# Patient Record
Sex: FEMALE | Race: WHITE | NOT HISPANIC OR LATINO | Employment: FULL TIME | URBAN - METROPOLITAN AREA
[De-identification: names, ages, dates, MRNs, and addresses within clinical notes are randomized per-mention and may not be internally consistent; named-entity substitution may affect disease eponyms.]

---

## 2024-06-10 ENCOUNTER — LAB (OUTPATIENT)
Dept: LAB | Facility: HOSPITAL | Age: 44
End: 2024-06-10
Payer: COMMERCIAL

## 2024-06-10 ENCOUNTER — OFFICE VISIT (OUTPATIENT)
Dept: ENDOCRINOLOGY | Facility: CLINIC | Age: 44
End: 2024-06-10
Payer: COMMERCIAL

## 2024-06-10 VITALS
BODY MASS INDEX: 50.02 KG/M2 | HEIGHT: 64 IN | HEART RATE: 96 BPM | SYSTOLIC BLOOD PRESSURE: 120 MMHG | WEIGHT: 293 LBS | OXYGEN SATURATION: 99 % | DIASTOLIC BLOOD PRESSURE: 88 MMHG

## 2024-06-10 DIAGNOSIS — E66.01 MORBID OBESITY: ICD-10-CM

## 2024-06-10 DIAGNOSIS — Z79.4 TYPE 2 DIABETES MELLITUS WITH HYPERGLYCEMIA, WITH LONG-TERM CURRENT USE OF INSULIN: Primary | ICD-10-CM

## 2024-06-10 DIAGNOSIS — E03.9 HYPOTHYROIDISM, UNSPECIFIED TYPE: ICD-10-CM

## 2024-06-10 DIAGNOSIS — E11.65 TYPE 2 DIABETES MELLITUS WITH HYPERGLYCEMIA, WITH LONG-TERM CURRENT USE OF INSULIN: ICD-10-CM

## 2024-06-10 DIAGNOSIS — Z79.4 TYPE 2 DIABETES MELLITUS WITH HYPERGLYCEMIA, WITH LONG-TERM CURRENT USE OF INSULIN: ICD-10-CM

## 2024-06-10 DIAGNOSIS — E11.65 TYPE 2 DIABETES MELLITUS WITH HYPERGLYCEMIA, WITH LONG-TERM CURRENT USE OF INSULIN: Primary | ICD-10-CM

## 2024-06-10 LAB
ALBUMIN SERPL-MCNC: 4.3 G/DL (ref 3.5–5.2)
ALBUMIN UR-MCNC: <1.2 MG/DL
ALBUMIN/GLOB SERPL: 1.5 G/DL
ALP SERPL-CCNC: 103 U/L (ref 39–117)
ALT SERPL W P-5'-P-CCNC: 11 U/L (ref 1–33)
ANION GAP SERPL CALCULATED.3IONS-SCNC: 10 MMOL/L (ref 5–15)
AST SERPL-CCNC: 7 U/L (ref 1–32)
BILIRUB SERPL-MCNC: 0.4 MG/DL (ref 0–1.2)
BUN SERPL-MCNC: 11 MG/DL (ref 6–20)
BUN/CREAT SERPL: 16.7 (ref 7–25)
CALCIUM SPEC-SCNC: 9.1 MG/DL (ref 8.6–10.5)
CHLORIDE SERPL-SCNC: 100 MMOL/L (ref 98–107)
CO2 SERPL-SCNC: 23 MMOL/L (ref 22–29)
CREAT SERPL-MCNC: 0.66 MG/DL (ref 0.57–1)
CREAT UR-MCNC: 51.7 MG/DL
EGFRCR SERPLBLD CKD-EPI 2021: 111.8 ML/MIN/1.73
GLOBULIN UR ELPH-MCNC: 2.9 GM/DL
GLUCOSE BLDC GLUCOMTR-MCNC: 448 MG/DL (ref 70–105)
GLUCOSE SERPL-MCNC: 404 MG/DL (ref 65–99)
HBA1C MFR BLD: 15 % (ref 4.8–5.6)
MICROALBUMIN/CREAT UR: NORMAL MG/G{CREAT}
POTASSIUM SERPL-SCNC: 4.5 MMOL/L (ref 3.5–5.2)
PROT SERPL-MCNC: 7.2 G/DL (ref 6–8.5)
SODIUM SERPL-SCNC: 133 MMOL/L (ref 136–145)

## 2024-06-10 PROCEDURE — 1159F MED LIST DOCD IN RCRD: CPT | Performed by: INTERNAL MEDICINE

## 2024-06-10 PROCEDURE — 82043 UR ALBUMIN QUANTITATIVE: CPT

## 2024-06-10 PROCEDURE — 1160F RVW MEDS BY RX/DR IN RCRD: CPT | Performed by: INTERNAL MEDICINE

## 2024-06-10 PROCEDURE — 36415 COLL VENOUS BLD VENIPUNCTURE: CPT

## 2024-06-10 PROCEDURE — 82948 REAGENT STRIP/BLOOD GLUCOSE: CPT | Performed by: INTERNAL MEDICINE

## 2024-06-10 PROCEDURE — 82570 ASSAY OF URINE CREATININE: CPT

## 2024-06-10 PROCEDURE — 83036 HEMOGLOBIN GLYCOSYLATED A1C: CPT

## 2024-06-10 PROCEDURE — 80053 COMPREHEN METABOLIC PANEL: CPT

## 2024-06-10 PROCEDURE — 99204 OFFICE O/P NEW MOD 45 MIN: CPT | Performed by: INTERNAL MEDICINE

## 2024-06-10 RX ORDER — LANCETS 30 GAUGE
1 EACH MISCELLANEOUS
Qty: 200 EACH | Refills: 5 | Status: SHIPPED | OUTPATIENT
Start: 2024-06-10

## 2024-06-10 RX ORDER — BLOOD-GLUCOSE METER
KIT MISCELLANEOUS
COMMUNITY
Start: 2024-05-29

## 2024-06-10 RX ORDER — INSULIN LISPRO 100 [IU]/ML
5 INJECTION, SOLUTION INTRAVENOUS; SUBCUTANEOUS
Qty: 15 ML | Refills: 1 | Status: SHIPPED | OUTPATIENT
Start: 2024-06-10

## 2024-06-10 RX ORDER — OMEPRAZOLE 20 MG/1
20 CAPSULE, DELAYED RELEASE ORAL DAILY
COMMUNITY

## 2024-06-10 RX ORDER — PEN NEEDLE, DIABETIC 32GX 5/32"
1 NEEDLE, DISPOSABLE MISCELLANEOUS
Qty: 400 EACH | Refills: 2 | Status: SHIPPED | OUTPATIENT
Start: 2024-06-10

## 2024-06-10 RX ORDER — LANCETS 28 GAUGE
EACH MISCELLANEOUS
COMMUNITY
Start: 2024-05-28

## 2024-06-10 RX ORDER — BLOOD-GLUCOSE SENSOR
1 EACH MISCELLANEOUS
Qty: 6 EACH | Refills: 3 | Status: SHIPPED | OUTPATIENT
Start: 2024-06-10

## 2024-06-10 RX ORDER — LEVOTHYROXINE SODIUM 0.05 MG/1
1 TABLET ORAL
COMMUNITY
Start: 2024-05-07 | End: 2024-11-03

## 2024-06-10 RX ORDER — MEDROXYPROGESTERONE ACETATE 150 MG/ML
150 INJECTION, SUSPENSION INTRAMUSCULAR
COMMUNITY

## 2024-06-10 RX ORDER — KETOROLAC TROMETHAMINE 30 MG/ML
1 INJECTION, SOLUTION INTRAMUSCULAR; INTRAVENOUS
Qty: 1 EACH | Refills: 0 | Status: SHIPPED | OUTPATIENT
Start: 2024-06-10

## 2024-06-10 RX ORDER — INSULIN DEGLUDEC 100 U/ML
15 INJECTION, SOLUTION SUBCUTANEOUS
Qty: 15 ML | Refills: 1 | Status: SHIPPED | OUTPATIENT
Start: 2024-06-10

## 2024-06-10 RX ORDER — BLOOD-GLUCOSE METER
KIT MISCELLANEOUS
COMMUNITY

## 2024-06-10 RX ORDER — MELOXICAM 15 MG/1
15 TABLET ORAL DAILY
COMMUNITY

## 2024-06-10 NOTE — PATIENT INSTRUCTIONS
Please,    # Diabetes Management:    Please start insulin therapy as:    - Insulin Glargine (Slow acting insulin) 15 Units in the evening.  - Insulin lispro / aspart (Fast acting / meal time insulin): 5 Units with each meal.    Meal time insulin need to be taken 15 mins before meal. You can bring your insulin with you if you are planning to eat out.    If you plan to miss the meal please miss the meal time insulin as well.    Check your blood glucose four times a day: before breakfast, before lunch, before dinner and before bedtime. Maintain blood glucose logs. OR with continuous glucose monitoring system.      Low Blood Glucose:    - If you feel sweaty, anxious, shakiness, feel weak, trouble walking, feels like passing out or trouble seeing thing, please check your blood glucose and if its less than 70 or if your feel symptoms of low blood glucose then take one of the following:    *3 or 4 glucose tablets  *½ cup of juice or regular soda (not sugar-free)  *2 tablespoons of raisins  *4 or 5 saltine crackers  *1 tablespoon of sugar  *1 tablespoon of honey or corn syrup  *6 to 8 hard candies     Get blood work done today.  Follow-up in clinic back again in 8 weeks time.    Thank you for your visit today.    If you have any questions or concerns please feel free to reach out of the office.

## 2024-06-10 NOTE — PROGRESS NOTES
-----------------------------------------------------------------  ENDOCRINE CLINIC NOTE  -----------------------------------------------------------------        PATIENT NAME: Vivienne Cotto  PATIENT : 1980 AGE: 43 y.o.  MRN NUMBER: [id number]  PRIMARY CARE: [facility]    ==========================================================================    CHIEF COMPLAINT: Diabetes Management  DATE OF SERVICE: 06/10/24    ==========================================================================    HPI / SUBJECTIVE    43 y.o. female is seen in the clinic today for diabetes management.  Also reported to have a history of hypothyroidism which she wants her primary care to manage this stable even though the referral was for hypothyroidism rather than type 2 diabetes.    Diabetes Mellitus Hx:    -Diagnosis Date: Oct 2023  -Last known A1c: 12.8 in Dec 2023    -Current Therapy / Medications:  Nothing    -Past tried medications: (Not currently using)  Metformin - stomach issues    -Home BG logs / monitoring: Not checking    -Meals / Dietary Habits:  Three meals a day    -Last eye exam: Never had diabetes eye exam  -Neuropathy: Reports to have finger tips tingling   -Nephropathy: No CKD reported  -Last lipid panel: Not on statin therapy  -No CAD or CVA  -No personal or family history of pancreatitis or thyroid cancer  -Denied frequent UTI    ==========================================================================                                                PAST MEDICAL HISTORY    Past Medical History:   Diagnosis Date    Thyroid disease        ==========================================================================    PAST SURGICAL HISTORY    Past Surgical History:   Procedure Laterality Date     SECTION      X3       ==========================================================================    FAMILY HISTORY    Family History   Problem Relation Age of Onset    Cancer Mother     Thyroid disease Mother      Hypertension Mother     Diabetes Mother     Hyperlipidemia Mother     Hyperlipidemia Father     Hypertension Father     Heart disease Father     Diabetes Maternal Aunt     Diabetes Maternal Uncle     Diabetes Maternal Grandmother     Diabetes Maternal Grandfather        ==========================================================================    SOCIAL HISTORY    Social History     Socioeconomic History    Marital status:    Tobacco Use    Smoking status: Former     Types: Cigarettes    Smokeless tobacco: Former   Vaping Use    Vaping status: Never Used   Substance and Sexual Activity    Alcohol use: Yes    Drug use: Never    Sexual activity: Defer       ==========================================================================    MEDICATIONS      Current Outpatient Medications:     Blood Glucose Monitoring Suppl (FreeStyle Lite) w/Device kit, USE TO CHECK SUGAR DAILY AND AS NEEDED, Disp: , Rfl:     FREESTYLE LITE test strip, USE TO CHECK SUGAR DAILY AND AS NEEDED DX:E11.9, Disp: , Rfl:     Lancets (freestyle) lancets, USE TO CHECK SUGAR DAILY AND AS NEEDED, Disp: , Rfl:     levothyroxine (SYNTHROID, LEVOTHROID) 50 MCG tablet, Take 1 tablet by mouth Every Morning Before Breakfast., Disp: , Rfl:     medroxyPROGESTERone (DEPO-PROVERA) 150 MG/ML injection, Inject 1 mL into the appropriate muscle as directed by prescriber., Disp: , Rfl:     meloxicam (MOBIC) 15 MG tablet, Take 1 tablet by mouth Daily. with food, Disp: , Rfl:     omeprazole (priLOSEC) 20 MG capsule, Take 1 capsule by mouth Daily., Disp: , Rfl:     Continuous Glucose  (FreeStyle Devika 3 [place]) device, Use 1 each 4 (Four) Times a Day Before Meals & at Bedtime., Disp: 1 each, Rfl: 0    Continuous Glucose Sensor (FreeStyle Devika 3 Sensor) misc, Use 1 each 4 (Four) Times a Day Before Meals & at Bedtime., Disp: 6 each, Rfl: 3    glucose blood test strip, 1 each by Other route 4 (Four) Times a Day Before Meals & at Bedtime., Disp: 200 each,  Rfl: 5    insulin degludec (Tresiba FlexTouch) 100 UNIT/ML solution pen-injector injection, Inject 15 Units under the skin into the appropriate area as directed every night at bedtime., Disp: 15 mL, Rfl: 1    Insulin Lispro, 1 Unit Dial, (HumaLOG KwikPen) 100 UNIT/ML solution pen-injector, Inject 5 Units under the skin into the appropriate area as directed 3 (Three) Times a Day With Meals., Disp: 15 mL, Rfl: 1    Insulin Pen Needle (BD Pen Needle Marci U/F) 32G X 4 MM misc, Use 1 each 4 (Four) Times a Day Before Meals & at Bedtime., Disp: 400 each, Rfl: 2    Lancets misc, Use 1 each 4 (Four) Times a Day Before Meals & at Bedtime., Disp: 200 each, Rfl: 5    ==========================================================================    ALLERGIES    No Known Allergies    ==========================================================================    OBJECTIVE    Vitals:    06/10/24 0847   BP: 120/88   Pulse: 96   SpO2: 99%     Body mass index is 50.98 kg/m².     General: Alert, cooperative, no acute distress  Thyroid:  no enlargement/tenderness/palpable nodules  Lungs: Clear to auscultation bilaterally, respirations unlabored  Heart: Regular rate and rhythm, S1 and S2 normal, no murmur, rub or gallop  Abdomen: Soft, NT, ND and Bowel sounds Positive  Extremities:  Extremities normal, atraumatic, no cyanosis or edema    ==========================================================================    LAB EVALUATION    Lab Results   Component Value Date    BUN 13 11/12/2021    CREATININE 0.85 11/12/2021    K 3.8 11/12/2021    CO2 21 11/12/2021    CALCIUM 9.4 11/12/2021    ALBUMIN 4.2 11/12/2021    LABIL2 1.4 05/01/2019    AST 17.0 11/12/2021    ALT 8 04/20/2023    TRIG 141 04/20/2023    HDL 57 04/20/2023     (H) 04/20/2023     Lab Results   Component Value Date    HGBA1C 12.8 (H) 12/04/2023    HGBA1C 14.1 (H) 07/10/2023     Lab Results   Component Value Date    CREATININE 0.85 11/12/2021     Lab Results   Component Value  Date    FREET4 1.19 06/23/2020       ==========================================================================    ASSESSMENT AND PLAN    # Type 2 Diabetes Mellitus  # Morbid Obesity  -Discussed with patient in detail about pathophysiology of type 2 diabetes to which she verbalized understanding  - Patient is most likely in the state of glucose toxicity and therefore will require insulin therapy  - Last A1c was done in December 2024  - Will repeat baseline blood work today and start patient on insulin therapy on conservative dosing  - Patient to check blood sugar ACHS or through CGM whichever is covered by the insurance and maintain logs  - Will plan for follow-up in 6 to 8 weeks time and review the blood sugar and adjust therapy accordingly  - Patient will also require medication to improve insulin sensitivity but given patient is being started on insulin therapy we will just monitor on insulin and introduce SGLT2 inhibitor therapy and GLP-1 receptor agonist therapy in the future  - New adjusted regimen:  Insulin Lantus 15 units nightly  Insulin lispro 5 units with each meal  - Diabetes education planning  - In the follow-up visits we will also refer patient for diabetic eye examination as well    # Hypothyroidism, currently on levothyroxine replacement therapy being managed by primary care as requested by patient    Thank you for courtesy of consultation.    Return to clinic: 8 weeks    Entire assessment and plan was discussed and counseled the patient in detail to which patient verbalized understanding and agreed with care.  Answered all queries and concerns.    Part of this note may be an electronic transcription/translation of spoken language to printed text using the Dragon Dictation System.     Note: Portions of this note may have been copied from previous notes but documentation have been reviewed and edited as necessary to support clinical decision making for today's  visit.    ==========================================================================    INFORMATION PROVIDED TO PATIENT    Patient Instructions   Please,    # Diabetes Management:    Please start insulin therapy as:    - Insulin Glargine (Slow acting insulin) 15 Units in the evening.  - Insulin lispro / aspart (Fast acting / meal time insulin): 5 Units with each meal.    Meal time insulin need to be taken 15 mins before meal. You can bring your insulin with you if you are planning to eat out.    If you plan to miss the meal please miss the meal time insulin as well.    Check your blood glucose four times a day: before breakfast, before lunch, before dinner and before bedtime. Maintain blood glucose logs. OR with continuous glucose monitoring system.      Low Blood Glucose:    - If you feel sweaty, anxious, shakiness, feel weak, trouble walking, feels like passing out or trouble seeing thing, please check your blood glucose and if its less than 70 or if your feel symptoms of low blood glucose then take one of the following:    *3 or 4 glucose tablets  *½ cup of juice or regular soda (not sugar-free)  *2 tablespoons of raisins  *4 or 5 saltine crackers  *1 tablespoon of sugar  *1 tablespoon of honey or corn syrup  *6 to 8 hard candies     Get blood work done today.  Follow-up in clinic back again in 8 weeks time.    Thank you for your visit today.    If you have any questions or concerns please feel free to reach out of the office.       ==========================================================================  Agustin Cervantes MD  Department of Endocrine, Diabetes and Metabolism  [facility] [place]  ==========================================================================

## 2024-06-13 ENCOUNTER — PATIENT ROUNDING (BHMG ONLY) (OUTPATIENT)
Dept: ENDOCRINOLOGY | Facility: CLINIC | Age: 44
End: 2024-06-13
Payer: COMMERCIAL

## 2024-06-13 NOTE — PROGRESS NOTES
June 13, 2024    Hello, may I speak with Vivienne Cotto?    My name is Richa    I am  with [facility]  [address] [place] [address].    Before we get started may I verify your date of birth? 1980    I am calling to officially welcome you to our practice and ask about your recent visit. Is this a good time to talk? yes    Tell me about your visit with us. What things went well?  Doctor was informative       We're always looking for ways to make our patients' experiences even better. Do you have recommendations on ways we may improve?  no    Overall were you satisfied with your first visit to our practice? yes       I appreciate you taking the time to speak with me today. Is there anything else I can do for you? no      Thank you, and have a great day.

## 2024-10-09 ENCOUNTER — TELEPHONE (OUTPATIENT)
Dept: ENDOCRINOLOGY | Facility: CLINIC | Age: 44
End: 2024-10-09
Payer: COMMERCIAL

## 2024-10-09 NOTE — TELEPHONE ENCOUNTER
Tried to call patient to reschedule appt with dr gonsales. No answer left vm to call office to get scheduled again.   Appt was canceled.  Hub can schedule next available.

## 2024-12-10 DIAGNOSIS — E11.65 TYPE 2 DIABETES MELLITUS WITH HYPERGLYCEMIA, WITH LONG-TERM CURRENT USE OF INSULIN: ICD-10-CM

## 2024-12-10 DIAGNOSIS — Z79.4 TYPE 2 DIABETES MELLITUS WITH HYPERGLYCEMIA, WITH LONG-TERM CURRENT USE OF INSULIN: ICD-10-CM

## 2024-12-10 RX ORDER — INSULIN DEGLUDEC 100 U/ML
INJECTION, SOLUTION SUBCUTANEOUS
Qty: 9 ML | Refills: 1 | Status: SHIPPED | OUTPATIENT
Start: 2024-12-10

## 2025-07-20 DIAGNOSIS — E11.65 TYPE 2 DIABETES MELLITUS WITH HYPERGLYCEMIA, WITH LONG-TERM CURRENT USE OF INSULIN: ICD-10-CM

## 2025-07-20 DIAGNOSIS — Z79.4 TYPE 2 DIABETES MELLITUS WITH HYPERGLYCEMIA, WITH LONG-TERM CURRENT USE OF INSULIN: ICD-10-CM

## 2025-07-21 RX ORDER — ACYCLOVIR 800 MG/1
TABLET ORAL
Qty: 6 EACH | Refills: 3 | OUTPATIENT
Start: 2025-07-21